# Patient Record
Sex: MALE | Race: OTHER | HISPANIC OR LATINO | URBAN - METROPOLITAN AREA
[De-identification: names, ages, dates, MRNs, and addresses within clinical notes are randomized per-mention and may not be internally consistent; named-entity substitution may affect disease eponyms.]

---

## 2021-07-05 ENCOUNTER — OUTPATIENT (OUTPATIENT)
Dept: OUTPATIENT SERVICES | Facility: HOSPITAL | Age: 31
LOS: 1 days | End: 2021-07-05
Payer: MEDICAID

## 2021-07-05 DIAGNOSIS — Z20.828 CONTACT WITH AND (SUSPECTED) EXPOSURE TO OTHER VIRAL COMMUNICABLE DISEASES: ICD-10-CM

## 2021-07-05 LAB — SARS-COV-2 RNA SPEC QL NAA+PROBE: SIGNIFICANT CHANGE UP

## 2021-07-05 PROCEDURE — U0005: CPT

## 2021-07-05 PROCEDURE — U0003: CPT

## 2021-07-08 ENCOUNTER — OUTPATIENT (OUTPATIENT)
Dept: OUTPATIENT SERVICES | Facility: HOSPITAL | Age: 31
LOS: 1 days | End: 2021-07-08
Payer: MEDICAID

## 2021-07-08 VITALS
TEMPERATURE: 98 F | HEART RATE: 78 BPM | WEIGHT: 200.62 LBS | DIASTOLIC BLOOD PRESSURE: 81 MMHG | HEIGHT: 66 IN | SYSTOLIC BLOOD PRESSURE: 139 MMHG | RESPIRATION RATE: 14 BRPM | OXYGEN SATURATION: 98 %

## 2021-07-08 VITALS
RESPIRATION RATE: 15 BRPM | SYSTOLIC BLOOD PRESSURE: 143 MMHG | HEART RATE: 86 BPM | OXYGEN SATURATION: 100 % | DIASTOLIC BLOOD PRESSURE: 90 MMHG

## 2021-07-08 DIAGNOSIS — H27.01 APHAKIA, RIGHT EYE: ICD-10-CM

## 2021-07-08 DIAGNOSIS — Z98.49 CATARACT EXTRACTION STATUS, UNSPECIFIED EYE: Chronic | ICD-10-CM

## 2021-07-08 LAB — GLUCOSE BLDC GLUCOMTR-MCNC: 100 MG/DL — HIGH (ref 70–99)

## 2021-07-08 PROCEDURE — V2632: CPT

## 2021-07-08 PROCEDURE — 66985 INSERT LENS PROSTHESIS: CPT | Mod: RT

## 2021-07-08 PROCEDURE — 67036 REMOVAL OF INNER EYE FLUID: CPT | Mod: RT

## 2021-07-08 PROCEDURE — 82962 GLUCOSE BLOOD TEST: CPT

## 2021-07-08 PROCEDURE — 67036 REMOVAL OF INNER EYE FLUID: CPT | Mod: AS

## 2021-07-08 NOTE — ASU DISCHARGE PLAN (ADULT/PEDIATRIC) - CARE PROVIDER_API CALL
Natan Cohn)  Ophthalmology  [address] [place] [address], [address]  [place] [address]  Phone: [phone]  Fax: [phone]  Scheduled Appointment: 07/09/2021 10:45 AM

## 2021-12-29 ENCOUNTER — OUTPATIENT (OUTPATIENT)
Dept: OUTPATIENT SERVICES | Facility: HOSPITAL | Age: 31
LOS: 1 days | End: 2021-12-29
Payer: COMMERCIAL

## 2021-12-29 ENCOUNTER — TRANSCRIPTION ENCOUNTER (OUTPATIENT)
Age: 31
End: 2021-12-29

## 2021-12-29 DIAGNOSIS — Z20.828 CONTACT WITH AND (SUSPECTED) EXPOSURE TO OTHER VIRAL COMMUNICABLE DISEASES: ICD-10-CM

## 2021-12-29 DIAGNOSIS — Z98.49 CATARACT EXTRACTION STATUS, UNSPECIFIED EYE: Chronic | ICD-10-CM

## 2021-12-29 PROBLEM — E11.9 TYPE 2 DIABETES MELLITUS WITHOUT COMPLICATIONS: Chronic | Status: ACTIVE | Noted: 2021-07-08

## 2021-12-29 LAB — SARS-COV-2 RNA SPEC QL NAA+PROBE: SIGNIFICANT CHANGE UP

## 2021-12-29 PROCEDURE — U0003: CPT

## 2021-12-29 PROCEDURE — U0005: CPT

## 2022-11-11 NOTE — ASU PREOP CHECKLIST - BMI (KG/M2)
-- DO NOT REPLY / DO NOT REPLY ALL --  -- Message is from [facility] [employer] ([facility]) --    General Patient Message Patient returning call she's no.     Caller Information       Type Contact Phone/Fax    11/09/2022 11:17 AM CST Phone (Incoming) Virginia Durham (Self) [phone] (H)    11/11/2022 08:13 AM CST Phone (Incoming) Virginia Durham (Self) [phone] (H)    11/11/2022 02:51 PM CST Phone (Incoming) Virginia Durham (Self) [phone] (H)        Alternative phone number:     Can a detailed message be left? Yes    Message Turnaround:     Is it Working Hours? Yes - Working Hours     [place]:    Please give this turnaround time to the caller:   \"This message will be sent to [state Provider's name]. The clinical team will fulfill your request as soon as they review your message.\"                 32.4

## 2023-11-29 NOTE — ASU PATIENT PROFILE, ADULT - NS TRANSFER PATIENT BELONGINGS
Medicare Wellness Visit  Personalized Plan for Preventive Care  11/29/2023    An important way to stay healthy is to use preventive services provided by doctors and health care providers. Preventive services can find health problems early when treatment works best and can keep you from getting certain diseases or illnesses. Medicare pays for many preventive services to help keep you healthy. To complete your personal preventive care plan, you are in need of the following Health Maintenance screening services. The next due date is listed after the specific service. Health Maintenance Summary       Colorectal Cancer Screen- (Colonoscopy - Every 10 Years)  Ordered on 11/29/2023    Shingles Vaccine (1 of 2)  Overdue - never done    Hepatitis C Screening (Once)  Overdue - never done    Medicare Advantage- Medicare Wellness Visit (Yearly - January to December)  Overdue since 1/1/2023    Breast Cancer Screening (Every 2 Years)  Ordered on 11/29/2023    Depression Screening (Yearly)  Next due on 11/29/2024    DTaP/Tdap/Td Vaccine (2 - Td or Tdap)  Next due on 4/23/2025    Cervical Cancer Screen 30-64 - (PAP/HPV Co-Testing - Every 5 Years)  Next due on 3/11/2026    Influenza Vaccine   Completed    COVID-19 Vaccine   Completed    Meningococcal Vaccine   Aged Out    Hepatitis B Vaccine (For Physician/APC Discussion)   Aged Out    HPV Vaccine   Aged Out    Pneumococcal Vaccine 0-64   Aged Out          Orders Placed This Encounter    MAMMO SCREENING BILATERAL    Occult Blood - iFOB (aka FIT)    levothyroxine 150 MCG tablet    lisinopril-hydroCHLOROthiazide (ZESTORETIC) 20-25 MG per tablet       Preventive Care for Women and Men    Heart Screenings (Cardiovascular):  Blood tests are used to check your cholesterol, lipid and triglyceride levels. High levels can increase your risk for heart disease and stroke. High levels can be treated with medications, diet and exercise.  Lowering your levels can help keep your heart and blood vessels healthy. Your provider will order these tests if they are needed. An ultrasound is done to see if you have an abdominal aortic aneurysm (AAA). This is an enlargement of one of the main blood vessels that delivers blood to the body. In the [facility], 9,000 deaths are caused by AAA. You may not even know you have this problem and as many as 1 in 3 people will have a serious problem if it is not treated. Early diagnosis allows for more effective treatment and cure. If you have a family history of AAA or are a male age 70-76 who has smoked, you are at higher risk of an AAA. Your provider can order this test, if needed. Colorectal Screening: There are many tests that are used to check for cancer of your colon and rectum. You and your provider should discuss what test is best for you and when to have it done. Options include:  Screening Colonoscopy: exam of the entire colon, seen through a flexible lighted tube. Cologuard DNA stool test: a sample of your stool is used to screen for cancer and unseen blood in your stool. Fecal Occult Blood Test: a sample of your stool is studied to find any unseen blood    Flu Shot:  An immunization that helps to prevent influenza (the flu). You should get this every year. The best time to get the shot is in the fall. Pneumococcal Shot:  Vaccines are available that can help prevent pneumococcal disease, which is any type of infection caused by Streptococcus pneumoniae bacteria. Their use can prevent some cases of pneumonia, meningitis, and sepsis. There are two types of pneumococcal vaccines:   Conjugate vaccines (PCV-13 or Prevnar 13Â®) - helps protect against the 13 types of pneumococcal bacteria that are the most common causes of serious infections in children and adults. Polysaccharide vaccine (PPSV23 or [id number]®) - helps protect against 23 types of pneumococcal bacteria for patients who are recommended to get it.   These vaccines should be given at least 12 months apart. A booster is usually not needed. Hepatitis B Shot:  An immunization that helps to protect people from getting Hepatitis B. Hepatitis B is a virus that spreads through contact with infected blood or body fluids. Many people with the virus do not have symptoms. The virus can lead to serious problems, such as liver disease. Some people are at higher risk than others. Your doctor will tell you if you need this shot. Diabetes Screening:  A test to measure sugar (glucose) in your blood is called a fasting blood sugar. Fasting means you cannot have food or drink for at least 8 hours before the test. This test can detect diabetes long before you may notice symptoms. Glaucoma Screening:  Glaucoma screening is performed by your eye doctor. The test measures the fluid pressure inside your eyes to determine if you have glaucoma. Hepatitis C Screening:  A blood test to see if you have the hepatitis C virus. Hepatitis C attacks the liver and is a major cause of chronic liver disease. Medicare will cover a single screening for all adults born between [facility], or high risk patients (people who have injected illegal drugs or people who have had blood transfusions). High risk patients who continue to inject illegal drugs can be screened for Hepatitis C every year. Smoking and Tobacco-Use Cessation Counseling:  Tobacco is the single greatest cause of disease and early death in our country today. Medication and counseling together can increase a personâs chance of quitting for good. Medicare covers two quitting attempts per year, with four counseling sessions per attempt (eight sessions in a 12 month period)    Preventive Screening tests for Women    Screening Mammograms and Breast Exams: An x-ray of your breasts to check for breast cancer before you or your doctor may be able to feel it. If breast cancer is found early it can usually be treated with success.     Pelvic Exams and Pap Tests:  An exam to check for cervical and vaginal cancer. A Pap test is a lab test in which cells are taken from your cervix and sent to the lab to look for signs of cervical cancer. If cancer of the cervix is found early, chances for a cure are good. Testing can generally end at age 65, or if a woman has a hysterectomy for a benign condition. Your provider may recommend more frequent testing if certain abnormal results are found.    Bone Mass Measurements:  A painless x-ray of your bone density to see if you are at risk for a broken bone. Bone density refers to the thickness of bones or how tightly the bone tissue is packed.    Preventive Screening tests for Men    Prostate Screening:  Should you have a prostate cancer test (PSA)?  It is up to you to decide if you want a prostate cancer test. Talk to your clinician to find out if the test is right for you.  Things for you to consider and talk about should include:  Benefits and harms of the test  Your family history  How your race/ethnicity may influence the test  If the test may impact other medical conditions you have  Your values on screenings and treatments    *Medicare pays for many preventive services to keep you healthy. For some of these services, you might have to pay a deductible, coinsurance, and / or copayment.  The amounts vary depending on the type of services you need and the kind of Medicare health plan you have.    For further details on screenings offered by Medicare please visit: https://www.medicare.gov/coverage/preventive-screening-services     Lab Services on 11/24/2023   Component Date Value Ref Range Status    Fasting Status 11/24/2023 14  0 - 999 Hours Final    Sodium 11/24/2023 137  135 - 145 mmol/L Final    Potassium 11/24/2023 4.6  3.4 - 5.1 mmol/L Final    Chloride 11/24/2023 101  97 - 110 mmol/L Final    Carbon Dioxide 11/24/2023 30  21 - 32 mmol/L Final    Anion Gap 11/24/2023 11  7 - 19 mmol/L Final    Glucose  11/24/2023 121 (H)  70 - 99 mg/dL Final    BUN 11/24/2023 17  6 - 20 mg/dL Final    Creatinine 11/24/2023 0.80  0.51 - 0.95 mg/dL Final    Glomerular Filtration Rate 11/24/2023 83  >=60 Final    eGFR results = or >60 mL/min/1.73m2 = Normal kidney function. Estimated GFR calculated using the CKD-EPI-R (2021) equation that does not include race in the creatinine calculation.     BUN/Cr 11/24/2023 21  7 - 25 Final    Calcium 11/24/2023 9.4  8.4 - 10.2 mg/dL Final    Bilirubin, Total 11/24/2023 1.2 (H)  0.2 - 1.0 mg/dL Final    GOT/AST 11/24/2023 20  <=37 Units/L Final    GPT/ALT 11/24/2023 37  <64 Units/L Final    Alkaline Phosphatase 11/24/2023 101  45 - 117 Units/L Final    Albumin 11/24/2023 3.7  3.6 - 5.1 g/dL Final    Protein, Total 11/24/2023 6.9  6.4 - 8.2 g/dL Final    Globulin 11/24/2023 3.2  2.0 - 4.0 g/dL Final    A/G Ratio 11/24/2023 1.2  1.0 - 2.4 Final    T4, Free 11/24/2023 1.2  0.8 - 1.5 ng/dL Final    TSH 11/24/2023 2.070  0.350 - 5.000 mcUnits/mL Final    WBC 11/24/2023 6.2  4.2 - 11.0 K/mcL Final    RBC 11/24/2023 4.91  4.00 - 5.20 mil/mcL Final    HGB 11/24/2023 14.5  12.0 - 15.5 g/dL Final    HCT 11/24/2023 46.0  36.0 - 46.5 % Final    MCV 11/24/2023 93.7  78.0 - 100.0 fl Final    MCH 11/24/2023 29.5  26.0 - 34.0 pg Final    MCHC 11/24/2023 31.5 (L)  32.0 - 36.5 g/dL Final    RDW-CV 11/24/2023 13.9  11.0 - 15.0 % Final    RDW-SD 11/24/2023 47.4  39.0 - 50.0 fL Final    PLT 11/24/2023 278  140 - 450 K/mcL Final    NRBC 11/24/2023 0  <=0 /100 WBC Final    Neutrophil, Percent 11/24/2023 69  % Final    Lymphocytes, Percent 11/24/2023 17  % Final    Mono, Percent 11/24/2023 11  % Final    Eosinophils, Percent 11/24/2023 2  % Final    Basophils, Percent 11/24/2023 1  % Final    Immature Granulocytes 11/24/2023 0  % Final    Absolute Neutrophils 11/24/2023 4.2  1.8 - 7.7 K/mcL Final    Absolute Lymphocytes 11/24/2023 1.1  1.0 - 4.0 K/mcL Final    Absolute Monocytes 11/24/2023 0.7  0.3 - 0.9 K/mcL Final Absolute Eosinophils  11/24/2023 0.1  0.0 - 0.5 K/mcL Final    Absolute Basophils 11/24/2023 0.1  0.0 - 0.3 K/mcL Final    Absolute Immature Granulocytes 11/24/2023 0.0  0.0 - 0.2 K/mcL Final Cell Phone/PDA (specify)